# Patient Record
Sex: MALE | Race: WHITE | Employment: FULL TIME | URBAN - METROPOLITAN AREA
[De-identification: names, ages, dates, MRNs, and addresses within clinical notes are randomized per-mention and may not be internally consistent; named-entity substitution may affect disease eponyms.]

---

## 2019-09-24 ENCOUNTER — APPOINTMENT (OUTPATIENT)
Dept: GENERAL RADIOLOGY | Facility: CLINIC | Age: 59
End: 2019-09-24
Attending: PHYSICIAN ASSISTANT
Payer: COMMERCIAL

## 2019-09-24 ENCOUNTER — HOSPITAL ENCOUNTER (EMERGENCY)
Facility: CLINIC | Age: 59
Discharge: HOME | End: 2019-09-24
Attending: PHYSICIAN ASSISTANT | Admitting: PHYSICIAN ASSISTANT
Payer: COMMERCIAL

## 2019-09-24 ENCOUNTER — APPOINTMENT (OUTPATIENT)
Dept: GENERAL RADIOLOGY | Facility: CLINIC | Age: 59
End: 2019-09-24
Attending: EMERGENCY MEDICINE
Payer: COMMERCIAL

## 2019-09-24 VITALS
WEIGHT: 261 LBS | SYSTOLIC BLOOD PRESSURE: 173 MMHG | RESPIRATION RATE: 18 BRPM | TEMPERATURE: 98.3 F | DIASTOLIC BLOOD PRESSURE: 98 MMHG | BODY MASS INDEX: 35.35 KG/M2 | HEART RATE: 86 BPM | HEIGHT: 72 IN | OXYGEN SATURATION: 99 %

## 2019-09-24 DIAGNOSIS — S62.306A CLOSED DISPLACED FRACTURE OF FIFTH METACARPAL BONE OF RIGHT HAND, UNSPECIFIED PORTION OF METACARPAL, INITIAL ENCOUNTER: ICD-10-CM

## 2019-09-24 DIAGNOSIS — S62.304A CLOSED DISPLACED FRACTURE OF FOURTH METACARPAL BONE OF RIGHT HAND, UNSPECIFIED PORTION OF METACARPAL, INITIAL ENCOUNTER: ICD-10-CM
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
## 2019-09-24 DIAGNOSIS — R03.0 ELEVATED BLOOD PRESSURE READING: ICD-10-CM

## 2019-09-24 PROCEDURE — 29125 APPL SHORT ARM SPLINT STATIC: CPT | Mod: RT

## 2019-09-24 PROCEDURE — 73110 X-RAY EXAM OF WRIST: CPT | Mod: RT

## 2019-09-24 PROCEDURE — 73130 X-RAY EXAM OF HAND: CPT | Mod: RT

## 2019-09-24 PROCEDURE — 99284 EMERGENCY DEPT VISIT MOD MDM: CPT

## 2019-09-24 RX ORDER — OXYCODONE HYDROCHLORIDE 5 MG/1
5 TABLET ORAL EVERY 6 HOURS PRN
Qty: 12 TABLET | Refills: 0 | Status: SHIPPED | OUTPATIENT
Start: 2019-09-24 | End: 2020-11-06

## 2019-09-24 ASSESSMENT — ENCOUNTER SYMPTOMS
FACIAL SWELLING: 1
JOINT SWELLING: 1
WOUND: 1

## 2019-09-24 ASSESSMENT — MIFFLIN-ST. JEOR: SCORE: 1867.95

## 2019-09-24 NOTE — ED PROVIDER NOTES
Emergency Department Attending Supervision Note  9/24/2019  5:13 PM    I evaluated this patient in conjunction with Isi Alvarenga PA-C    Visit original note for extended HPI.  Briefly, the patient presented with pain to his right hand after a fall sustained yesterday while trying to catch a bus outside of the [place].  He also hit his face but has minimal discomfort there and does not wish for any further evaluation.  He is not on blood thinners.  He has a fair bit of typing for work.  No prior similar symptoms.  The swelling is improved since yesterday but still persistent.  The pain is manageable.  No focal numbness or weakness.    On my exam, he has tenderness to palpation over the fourth and fifth metacarpals, with swelling to the dorsum of the hand as well as somewhat to the palm as well.  Several superficial abrasions to the palm though no evidence of penetrating injury or open fracture.        Procedure Note - Splint  A custom plaster short arm/hand splint was applied to the R hand/forearm, and the patient's neurovascular status remained unchanged and patient was comfortable.  No complications evident.  Standard splint care instructions and precautions were given.      My impression is that his symptoms can be explained by the fractures of his metacarpals seen on x-ray.  He is neurovascularly intact.  He was placed in a plaster splint.  I recommended close outpatient follow-up with orthopedics, and discussed that he may ultimately need surgery though there are no findings to suggest that this is emergent.  He is welcome back for acute worsening otherwise should follow-up through primary care and orthopedic clinics for ongoing care.    Diagnosis    ICD-10-CM    1. Closed displaced fracture of fourth metacarpal bone of right hand, unspecified portion of metacarpal, initial encounter S62.304A    2. Closed displaced fracture of fifth metacarpal bone of right hand, unspecified portion of metacarpal,  initial encounter S62.306A    3. Fall, initial encounter W19.XXXA    4. Elevated blood pressure reading R03.0        Kenneth Pedraza MD    Scribe Disclosure:  I, Álvaro Matos, am serving as a scribe on 9/24/2019 at 5:13 PM to personally document services performed by Kenneth Pedraza MD based on my observations and the provider's statements to me.     This record was created at least in part using electronic voice recognition software, so please excuse any typographical errors.     Kneneth Pedraza MD  09/25/19 0102

## 2019-09-24 NOTE — ED AVS SNAPSHOT
Emergency Department  [address] [place] [id number]  Phone:  [phone]  Fax:  [phone]                                    Frankie Rubio   MRN: [id number]    Department:   Emergency Department   Date of Visit:  9/24/2019           After Visit Summary Signature Page    I have received my discharge instructions, and my questions have been answered. I have discussed any challenges I see with this plan with the nurse or doctor.    ..........................................................................................................................................  Patient/Patient Representative Signature      ..........................................................................................................................................  Patient Representative Print Name and Relationship to Patient    ..................................................               ................................................  Date                                   Time    ..........................................................................................................................................  Reviewed by Signature/Title    ...................................................              ..............................................  Date                                               Time          22EPIC Rev 08/18

## 2019-09-24 NOTE — DISCHARGE INSTRUCTIONS
Take ibuprofen 600 mg 3x per day with food. This will provide both pain control and fight against inflammation.   Oxycodone as needed for break through pain.   Call tomorrow to schedule follow-up appointment with [facility] orthopedics.    Discharge Instructions  Splint Care    You had a splint put on today to help protect your injury and help it heal.  Splints are used to treat things like strains, sprains, large cuts, and fractures (broken bones). Splints are temporary and are designed to allow for swelling.    Be sure your splint is not too tight!  If you splint is too tight, it may cause loss of blood supply.  Signs of your splint being too tight include: your arm or leg hurting a lot more; your fingers or toes getting numb, cold, pale or blue; or your child is crying, fussing or seeming restless.    Generally, every Emergency Department visit should have a follow-up clinic visit with either a primary or a specialty clinic/provider. Please follow-up as instructed by your emergency provider today.  Return to the Emergency Department right away if:  You have increased pain or pressure around the injury.  You have numbness, tingling, or cool, pale, or blue toes or fingers past the injury.  Your child is more fussy than normal, crying a lot, or restless.  Your splint becomes soft, breaks, or is wet.  Your splint begins to smell bad.  Your splint is cutting into your skin.    Home care:  Keep the injured area above the level of your heart while laying or sitting down.  This will help decrease the swelling and the pain.  Keep the splint dry.  Do not put objects down or inside the splint.  If there is an elastic bandage (Ace  wrap) holding the splint on this may be loosened slightly to relieve pressure or pain.  If pain continues return to the Emergency Department right away.  Do not remove your splint by yourself unless told to by your provider.    Follow-up:  Sometimes the splint put on in the Emergency Department  needs to be changed once the swelling has gone down and a more permanent cast needs to be placed.  This is usually done by a bone specialist provider (Orthopedist).  Follow the instructions given to you by your provider today.    If you were given a prescription for medicine here today, be sure to read all of the information (including the package insert) that comes with your prescription.  This will include important information about the medicine, its side effects, and any warnings that you need to know about.  The pharmacist who fills the prescription can provide more information and answer questions you may have about the medicine.  If you have questions or concerns that the pharmacist cannot address, please call or return to the Emergency Department.     Remember that you can always come back to the Emergency Department if you are not able to see your regular provider in the amount of time listed above, if you get any new symptoms, or if there is anything that worries you.       Discharge Instructions  Hypertension - High Blood Pressure    During you visit to the Emergency Department, your blood pressure was higher than the recommended blood pressure.  This may be related to stress, pain, medication or other temporary conditions. In these cases, your blood pressure may return to normal on its own. If you have a history of high blood pressure, you may need to have your provider adjust your medications. Sometimes, your high measurement here may indicate that you have developed high blood pressure that will stay high unless it is treated. As a general rule, high blood pressure causes problems over years rather than days, weeks, or months. So, while it is important to treat blood pressure, it is rarely important to treat blood pressure immediately. Occasionally we will begin a medication in the Emergency Department; more often we will recommend close follow-up for medications with a primary  doctor/clinic.    Generally, every Emergency Department visit should have a follow-up clinic visit with either a primary or a specialty clinic/provider. Please follow-up as instructed by your emergency provider today.    Return to the Emergency Department if you start to have:  A severe headache.  Chest pain.  Shortness of breath.  Weakness or numbness that affects one part of the body.  Confusion.  Vision changes.  Significant swelling of legs and/or eyes.  A reaction to any medication started in the Emergency Department.    What can I do to help myself?  Avoid alcohol.  Take any blood pressure medicine that you are prescribed.  Get a good night s sleep.  Lower your salt intake.  Exercise.  Lose weight.  Manage stress.  See your doctor regularly    If blood pressure medication was started in the Emergency Department:  The medicine may not have an immediate effect. The body and brain determine what blood pressure you have. The medicine s job is to retrain the body s  thermostat  to a lower blood pressure.  You will need to follow up with your provider to see how this medicine is working for you.  If you were given a prescription for medicine here today, be sure to read all of the information (including the package insert) that comes with your prescription.  This will include important information about the medicine, its side effects, and any warnings that you need to know about.  The pharmacist who fills the prescription can provide more information and answer questions you may have about the medicine.  If you have questions or concerns that the pharmacist cannot address, please call or return to the Emergency Department.   Remember that you can always come back to the Emergency Department if you are not able to see your regular provider in the amount of time listed above, if you get any new symptoms, or if there is anything that worries you.

## 2019-09-24 NOTE — ED PROVIDER NOTES
"  History   Chief Complaint:  Fall    HPI   Frankie Rubio is a 58 year old male who presents for evaluation after a fall. The patient reports that last night while running to catch the bus he tripped and fell onto the ground. He states he braced himself mainly with his right hand and wrist but also hit his face on the ground. He did sustain abrasions to his left knee as well as his nose. After the injury, the patient developed swelling to the right side of his face and right wrist as well as pain in the nose and right wrist. Throughout the day today, the swelling in the patient's face has resolved however the hand and wrist swelling has persisted, prompting his evaluation in the ED. He denies any loss of consciousness.    Allergies:  No known drug allergies    Medications:    The patient is currently on no regular medications.    Past Medical History:    The patient does not have any past pertinent medical history.    Past Surgical History:    History reviewed. No pertinent surgical history.    Family History:    History reviewed. No pertinent family history.     Social History:  The patient presents to the ED accompanied by his brother.    Review of Systems   HENT: Positive for facial swelling (right sided, resolved).         Positive for pain in the nose.   Musculoskeletal: Positive for joint swelling (right wrist).        Positive for pain in right wrist   Skin: Positive for wound (abrasions to left knee, nose).   All other systems reviewed and are negative.      Physical Exam   Patient Vitals for the past 24 hrs:   BP Temp Temp src Pulse Resp SpO2 Height Weight   09/24/19 1743 (!) 173/98 -- -- 86 -- -- -- --   09/24/19 1540 (!) 192/110 98.3  F (36.8  C) Oral 102 18 99 % 1.816 m (5' 11.5\") 118.4 kg (261 lb)     Physical Exam  General: Alert, interactive.   Head:  Scalp is atraumatic.  Swelling and ecchymosis to the nasal bridge.  No crepitus.  No obvious deformity.  Remainder of facial bones " nontender.  Eyes:  EOM intact. The pupils are equal, round, and reactive to light. No scleral icterus.   ENT:                                      Ears:  The external ears are normal.   Nose:  The external nose is normal.  Throat:  The oropharynx is normal. Mucus membranes are moist.                 Neck:  Normal range of motion. There is no rigidity.   CV:  Regular rate and rhythm. No murmur. 2+ radial pulses  Resp:  Breath sounds are clear bilaterally. Non-labored, no retractions or accessory muscle use.  GI:  Abdomen is soft, no distension, no tenderness.   MS:  Diffuse swelling to the right wrist and hand. Tenderness to right 4th and 5th metacarpals. No snuffbox tenderness.  No tenderness to palpation throughout the remaining  finger bones. Diffuse tenderness to the wrist and unable to flex/extend wrist secondary to swelling and pain.    Skin:  Warm and dry.   Neuro: Unable to assess strength of 4th and 5th digits secondary to pain. The thumb exam is normal, including: Adduction, abduction, flexion, extension, opposition. There are no sensory deficits; Median, Ulnar, and Radial nerve function is normal.  Psych:  Awake. Alert.  Appropriate interactions.     Emergency Department Course   Imaging:  Radiographic findings were communicated with the patient who voiced understanding of the findings.    XR Hand Right G/E 3 Views:  1. Mildly comminuted fracture of the proximal portion of the fifth  metacarpal with mild displacement/distraction.  2. Oblique fracture of the proximal portion of the fourth metacarpal  with significant dorsal displacement.  3. Fracture of the fourth metacarpal head and distal diaphysis. There  is displacement at the medial aspect of the metacarpal head.  4. Fracture of the fifth metacarpal neck with mild displacement of a  small fragment.  As read by Radiology.    XR Wrist Right G/E 3 Views:  Again seen are fractures of the proximal and distal fourth  and fifth metacarpal bones as previously  described, unchanged. No  other acute or chronic bony abnormality. Extensive subcutaneous edema  over the dorsum of the hand, wrist and visualized distal forearm.  As read by Radiology.    Procedures:     Splint Placement   Dorsal volar splint was applied to the right upper extremity and after placement I checked and adjusted the fit to ensure proper positioning. The patient was more comfortable with the splint in place. Sensation and circulation are intact after splint placement.    Emergency Department Course:  Past medical records, nursing notes, and vitals reviewed.   1606: I performed an exam of the patient and obtained history, as documented above.    The patient was sent for a right wrist x-ray and right hand x-ray while in the emergency department, findings above.    1718: Service shared with ED physician, Dr. Robert Pedraza who evaluated to patient. See his note for further details.    1745: I rechecked the patient. Checked splint placement.    Findings and plan explained to the patient. Patient discharged home with instructions regarding supportive care, medications, and reasons to return. The importance of close follow-up was reviewed.     Impression & Plan    Medical Decision Making:  Frankie Rubio is a 58 year old male who presents for evaluation of right hand and wrist pain after a fall last night. The patient states he was running to catch the bus and tripped and fell onto the ground, catching himself with his right hand. He also hit his face on the ground and had abrasions to the nose.  Discussed possibility of fracture and imaging would not  at this time.  Plan to follow-up with ENT in 1 week for nasal deformity or difficulty breathing out of the nose.  Patient agrees with this plan.  There is no loss consciousness and no red flag symptoms to suggest need for intracranial hemorrhage and there is no indication for head CT at this time.  Patient agrees.       X-rays reveal various  fractures to the right 4th and 5th metacarpals that do not require reduction at this time. The patient understands that this may change with time and orthopedic follow-up is indicated. Splint was placed and positioning was checked. There is no indication for hand/ortho consultation from the ED. Rather, close follow-up is indicated in the next days.  I recommended calling tomorrow to schedule follow-up appointment.  Splint and fracture precautions for home.  The patient's head-to-toe trauma exam is otherwise normal at this time and no further trauma workup is needed as I believe there are no signs of serious head, neck, chest, spinal, extremity or abdominal injuries.  The patient agrees with this plan all questions and concerns addressed prior to discharge.  Recommended continuing ibuprofen and Tylenol and oxycodone given for breakthrough pain.  I discussed the side effects of this medication including addictive and sedated properties.      Diagnosis:    ICD-10-CM   1. Closed displaced fracture of fourth metacarpal bone of right hand, unspecified portion of metacarpal, initial encounter S62.304A   2. Closed displaced fracture of fifth metacarpal bone of right hand, unspecified portion of metacarpal, initial encounter S62.306A   3. Fall, initial encounter W19.XXXA   4. Elevated blood pressure reading R03.0       Disposition:  Discharged to home    Discharge Medications:   Details   oxyCODONE (ROXICODONE) 5 MG tablet Take 1 tablet (5 mg) by mouth every 6 hours as needed for pain, Disp-12 tablet, R-0, Local Print       Ervin Paris  9/24/2019    EMERGENCY DEPARTMENT  I, Ervin Paris, am serving as a scribe at 4:06 PM on 9/24/2019 to document services personally performed by Isi Alvarenga PA-C based on my observations and the provider's statements to me.        Isi Alvarenga PA-C  09/24/19 1954

## 2020-03-11 ENCOUNTER — HEALTH MAINTENANCE LETTER (OUTPATIENT)
Age: 60
End: 2020-03-11

## 2020-11-06 ENCOUNTER — VIRTUAL VISIT (OUTPATIENT)
Dept: FAMILY MEDICINE | Facility: CLINIC | Age: 60
End: 2020-11-06
Payer: COMMERCIAL

## 2020-11-06 DIAGNOSIS — H60.393 INFECTIVE OTITIS EXTERNA, BILATERAL: Primary | ICD-10-CM

## 2020-11-06 PROCEDURE — 99203 OFFICE O/P NEW LOW 30 MIN: CPT | Mod: 95 | Performed by: INTERNAL MEDICINE

## 2020-11-06 RX ORDER — NEOMYCIN SULFATE, POLYMYXIN B SULFATE, HYDROCORTISONE 3.5; 10000; 1 MG/ML; [USP'U]/ML; MG/ML
3 SOLUTION/ DROPS AURICULAR (OTIC) 4 TIMES DAILY
Qty: 10 ML | Refills: 0 | Status: SHIPPED | OUTPATIENT
Start: 2020-11-06

## 2020-11-06 RX ORDER — METOPROLOL SUCCINATE 50 MG/1
1 TABLET, EXTENDED RELEASE ORAL DAILY
COMMUNITY
Start: 2020-08-18

## 2020-11-06 RX ORDER — ASPIRIN 81 MG/1
81 TABLET, CHEWABLE ORAL DAILY
COMMUNITY
Start: 2020-08-14

## 2020-11-06 NOTE — PROGRESS NOTES
"Frankie Rubio is a 59 year old male who is being evaluated via a billable video visit.      The patient has been notified of following:     \"This video visit will be conducted via a call between you and your physician/provider. We have found that certain health care needs can be provided without the need for an in-person physical exam.  This service lets us provide the care you need with a video conversation.  If a prescription is necessary we can send it directly to your pharmacy.  If lab work is needed we can place an order for that and you can then stop by our lab to have the test done at a later time.    Video visits are billed at different rates depending on your insurance coverage.  Please reach out to your insurance provider with any questions.    If during the course of the call the physician/provider feels a video visit is not appropriate, you will not be charged for this service.\"    Patient has given verbal consent for Video visit? Yes  How would you like to obtain your AVS? MyChart  If you are dropped from the video visit, the video invite should be resent to: Text to cell phone: HERMINIA  Will anyone else be joining your video visit? No    Subjective     Frankie Rubio is a 59 year old male who presents today via video visit for the following health issues:    HPI this is a 59-year-old male who presents clinic today with issues of clear urine drainage.  The patient does not swim.  No obvious exposures.  He has been utilizing Q-tips to the ear.  He is states he has never had an issue with wax buildup in his ears.    Denies any fevers and no significant pain.  No infection.  Discharge is bilateral.        Chief Complaint   Patient presents with     Ear Problem     bilateral       New Patient/Transfer of Care       Video Start Time: 12:51 PM    New Patient/Transfer of Care    Review of Systems   Constitutional, HEENT, cardiovascular, pulmonary, gi and gu systems are negative, except as otherwise " noted.      Objective           Vitals:  No vitals were obtained today due to virtual visit.    Physical Exam     GENERAL: Healthy, alert and no distress  EYES: Eyes grossly normal to inspection.  No discharge or erythema, or obvious scleral/conjunctival abnormalities.  RESP: No audible wheeze, cough, or visible cyanosis.  No visible retractions or increased work of breathing.    SKIN: Visible skin clear. No significant rash, abnormal pigmentation or lesions.  NEURO: Cranial nerves grossly intact.  Mentation and speech appropriate for age.  PSYCH: Mentation appears normal, affect normal/bright, judgement and insight intact, normal speech and appearance well-groomed.      No results found for any visits on 11/06/20.        Assessment & Plan     Frankie was seen today for ear problem.    Diagnoses and all orders for this visit:    Infective otitis externa, bilateral  -     neomycin-polymyxin-hydrocortisone (CORTISPORIN) [id number] otic solution; Place 3 drops into both ears 4 times daily    Patient with clear drainage most consistent with an otitis externa possibly related to the Q-tip utilization.  Instructed him to stop using Q-tips.  He can use warm water to clear the ears or alleviate some of his symptoms in the future.  In the meanwhile we will prescribe Cortisporin drops.  Instructed the patient not to use warm water to the ears on utilizing the Cortisporin drops.    Eczema of the ear canal is a possibility additionally.  If his symptoms recur he needs to come into the clinic so that I can visualize his ear canals        See Patient Instructions    No follow-ups on file.    Papi Nathan MD  [facility]      Video-Visit Details    Type of service:  Video Visit    Video End Time: 11 AM    Originating Location (pt. Location): Home    Distant Location (provider location):  [facility]     Platform used for Video Visit: Fame[employer]

## 2021-01-03 ENCOUNTER — HEALTH MAINTENANCE LETTER (OUTPATIENT)
Age: 61
End: 2021-01-03

## 2021-02-11 ENCOUNTER — NURSE TRIAGE (OUTPATIENT)
Dept: NURSING | Facility: CLINIC | Age: 61
End: 2021-02-11

## 2021-02-12 ENCOUNTER — OFFICE VISIT (OUTPATIENT)
Dept: URGENT CARE | Facility: URGENT CARE | Age: 61
End: 2021-02-12
Payer: COMMERCIAL

## 2021-02-12 VITALS
DIASTOLIC BLOOD PRESSURE: 93 MMHG | OXYGEN SATURATION: 94 % | TEMPERATURE: 96.3 F | SYSTOLIC BLOOD PRESSURE: 169 MMHG | BODY MASS INDEX: 35.89 KG/M2 | HEART RATE: 88 BPM | WEIGHT: 261 LBS

## 2021-02-12 DIAGNOSIS — M54.16 LUMBAR RADICULOPATHY: Primary | ICD-10-CM

## 2021-02-12 PROCEDURE — 99215 OFFICE O/P EST HI 40 MIN: CPT | Performed by: PHYSICIAN ASSISTANT

## 2021-02-12 RX ORDER — METHYLPREDNISOLONE 4 MG
TABLET, DOSE PACK ORAL
Qty: 21 TABLET | Refills: 0 | Status: SHIPPED | OUTPATIENT
Start: 2021-02-12

## 2021-02-12 NOTE — TELEPHONE ENCOUNTER
"Pt reports problem R leg. History of sciatica in R leg. Pt states \"if I sit for half an hour feel like I need to brace myself, not as strong\". Pt states feels weaker after sitting for awhile. Pt states he is able to walk ok after a little. Pt states \"no pain\". Symptoms started a couple of hours ago and came on suddenly after sitting. Pt does not report any other acute symptoms. Pt states symptoms are similar to sciatica symptoms he has had in the past.     Advised pt per protocol ER is recommended to r/o TIA or other serious neurological issue when sudden weakness occurs however if pt thinks symptoms more related to sciatica as previously experienced then ok to f/u with PCP.     Pt declines ER and reports he will go to ER if symptoms worsen or return. Will contact his PCP for f/u.     Reason for Disposition    [1] Weakness (i.e., paralysis, loss of muscle strength) of the face, arm / hand, or leg / foot on one side of the body AND [2] sudden onset AND [3] brief (now gone)    Additional Information    Negative: [1] SEVERE weakness (i.e., unable to walk or barely able to walk, requires support) AND [2] new onset or worsening    Negative: [1] Weakness (i.e., paralysis, loss of muscle strength) of the face, arm / hand, or leg / foot on one side of the body AND [2] sudden onset AND [3] present now    Negative: [1] Numbness (i.e., loss of sensation) of the face, arm / hand, or leg / foot on one side of the body AND [2] sudden onset AND [3] present now    Negative: [1] Loss of speech or garbled speech AND [2] sudden onset AND [3] present now    Negative: Difficult to awaken or acting confused (e.g., disoriented, slurred speech)    Negative: Sounds like a life-threatening emergency to the triager    Negative: Confusion, disorientation, or hallucinations is main symptom    Negative: Neck pain is main symptom (and having weakness, numbness, or tingling in arm / hand because of neck pain)    Negative: Back pain is main symptom " (and having weakness, numbness, or tingling in leg because of back pain)    Negative: Hand pain is main symptom (and having mild weakness, numbness, or tingling in hand related to hand pain)    Negative: Dizziness is main symptom    Negative: Vision loss or change is main symptom    Negative: Followed a head injury within last 3 days    Negative: Followed a neck injury within last 3 days    Negative: [1] Tingling in both hands and/or feet AND [2] breathing faster than normal AND [3] feels similar to prior panic attack or hyperventilation episode    Negative: Weakness in both sides of the body or weakness all over    Negative: Headache  (and neurologic deficit)    Negative: [1] Back pain AND [2] numbness (loss of sensation) in groin or rectal area    Negative: [1] Unable to urinate (or only a few drops) > 4 hours AND [2] bladder feels very full (e.g., palpable bladder or strong urge to urinate)    Negative: [1] Loss of bladder or bowel control (urine or bowel incontinence; wetting self, leaking stool) AND [2] new onset    Protocols used: NEUROLOGIC DEFICIT-A-AH

## 2021-02-13 NOTE — PATIENT INSTRUCTIONS
Schedule an appointment with a PrimaryCare provider   Patient Education     Possible Causes of Low Back or Leg Pain    BIG: The symptoms in your back or leg may be due to pressure on a nerve. This pressure may be caused by a damaged disk or by abnormal bone growth. Either way, you may feel pain, burning, tingling, or numbness. If you have pressure on a nerve that connects to the sciatic nerve, pain may shoot down your leg.    Pressure from the disk  Constant wear and tear can weaken a disk over time and cause back pain. The disk can then be damaged by a sudden movement or injury. If its soft center starts to bulge, the disk may press on a nerve. Or the outside of the disk may tear, and the soft center may squeeze through and pinch a nerve.    Pressure from bone  As a disk wears out, the vertebrae right above and below the disk start to touch. This can put pressure on a nerve. Often, abnormal bone (called bone spurs) grows where the vertebrae rub against each other. This can cause the foramen or the spinal canal to narrow (called stenosis) and press against a nerve.  [employer] last reviewed this educational content on 3/1/2018    [id number] [employer]. [address] [place] [address], [place] [address]. All rights reserved. This information is not intended as a substitute for professional medical care. Always follow your healthcare professional's instructions.

## 2021-02-13 NOTE — PROGRESS NOTES
Chief Complaint   Patient presents with     Urgent Care     Musculoskeletal Problem     x 1 day right leg feels numb on the inside. lower back stiff in the morning. slow walking     Ear Problem     had swimmers ear in December and would like to make sure it is gone       ASSESSMENT/PLAN:  Frankie was seen today for urgent care, musculoskeletal problem and ear problem.    Diagnoses and all orders for this visit:    Lumbar radiculopathy  -     MR Lumbar Spine w/o Contrast  -     methylPREDNISolone (MEDROL DOSEPAK) 4 MG tablet therapy pack; Follow Package Directions  -     Orthopedic & Spine  Referral; Future      Patient's exam findings are interesting because he has no focal neurologic deficit in the L4-L5 and S1 distribution but has difficulty maintaining balance on his right foot and doing heel-to-toe walk.  His Romberg is negative.  Overall I think there is some spinal cord compression causing disruption of the motor neuron and we should get this imaged urgently.  He has no red flag signs of urinary incontinence or retention or saddle paresthesias.  We will get the MRI done in the next day or so and have patient follow-up with his primary care provider or an orthopedic spine specialist for further evaluation.  We also discussed that there is a possible role for  corticosteroids as I suspect a herniated disc causing the symptoms.  Discussed medications proper administration and adverse side effects.  We also discussed there may not be improvement with the steroid but anecdotal evidence suggests otherwise.  I was very direct with him stating if he has any worsening symptoms, new symptoms, fever, chills, saddle paresthesias or urinary issues he needs to go the emergency room immediately.    His external auditory canals had no evidence of infection or otitis externa      40 minutes spent on the date of the encounter doing chart review, history and exam, documentation and further activities as noted  above    The plan of care was discussed with the patient. They understand and agree with the course of treatment prescribed. A printed summary was given including instructions and medications.    SUBJECTIVE:  Frankie is a 60 year old male with a history of right-sided sciatica who presents to urgent care with 1 day of a heavy/numbness feeling inside and down his right leg.  He noticed it sometime last night and had difficulty falling asleep and since then he feels like he is unable to take large steps due to being off balance with his right leg.  Denies any muscle weakness or pain.  Denies any acute injury or trauma.  He denies any fevers or chills.  He denies any headache, confusion, facial asymmetries or weakness, slurred speech and feels otherwise well    ROS: Pertinent ROS neg other than the symptoms noted above in the HPI.     OBJECTIVE:  BP (!) 169/93 (BP Location: Left arm, Patient Position: Standing, Cuff Size: Adult Large)   Pulse 88   Temp 96.3  F (35.7  C) (Tympanic)   Wt 118.4 kg (261 lb)   SpO2 94%   BMI 35.89 kg/m     GENERAL: healthy, alert and no distress  HENT: ear canals and TM's normal  MS: no gross musculoskeletal defects noted, no edema over lower extremities, no tenderness of the lower extremities.  No midline back pain  SKIN: no suspicious lesions or rashes  NEURO: Knee flexion extension, dorsiflexion and plantar flexion strength 5/5.  Walks with a slow and short right foot steps.  Feels heel-to-toe walk.  Romberg negative.  Deep tendon patellar reflex 2+ bilaterally and symmetric.  Negative straight leg raise  BACK: no midline tenderness, no paralumbar tenderness    DIAGNOSTICS    No results found for any visits on 02/12/21.     Current Outpatient Medications   Medication     aspirin (ASA) 81 MG chewable tablet     methylPREDNISolone (MEDROL DOSEPAK) 4 MG tablet therapy pack     metoprolol succinate ER (TOPROL-XL) 50 MG 24 hr tablet     neomycin-polymyxin-hydrocortisone (CORTISPORIN)  3.5-28888-5 otic solution     No current facility-administered medications for this visit.       There is no problem list on file for this patient.     History reviewed. No pertinent past medical history.  History reviewed. No pertinent surgical history.  History reviewed. No pertinent family history.  Social History     Tobacco Use     Smoking status: Former Smoker     Packs/day: 1.00     Years: 30.00     Pack years: 30.00     Types: Cigarettes     Start date: 1976     Quit date: 2005     Years since quittin.1     Smokeless tobacco: Never Used     Tobacco comment: glad i quit   Substance Use Topics     Alcohol use: Not Currently      Patient was seen in conjunction with Bina Corona, NP Student.    Rito Fields PA-C    The use of Dragon/[employer] dictation services may have been used to construct the content in this note; any grammatical or spelling errors are non-intentional. Please contact the author of this note directly if you are in need of any clarification.

## 2021-02-24 ENCOUNTER — TELEPHONE (OUTPATIENT)
Dept: URGENT CARE | Facility: URGENT CARE | Age: 61
End: 2021-02-24

## 2021-02-24 ENCOUNTER — MYC MEDICAL ADVICE (OUTPATIENT)
Dept: FAMILY MEDICINE | Facility: CLINIC | Age: 61
End: 2021-02-24

## 2021-02-24 NOTE — TELEPHONE ENCOUNTER
Called patient to follow up with his progress and see if his MRI was completed. No answer. Called twice. Please try and get a hold of patient and see what his status is. Thanks.

## 2021-04-25 ENCOUNTER — HEALTH MAINTENANCE LETTER (OUTPATIENT)
Age: 61
End: 2021-04-25

## 2021-10-10 ENCOUNTER — HEALTH MAINTENANCE LETTER (OUTPATIENT)
Age: 61
End: 2021-10-10

## 2021-11-18 NOTE — TELEPHONE ENCOUNTER
DIXIE to Dr. Nathan.  Sent this pt a mc to f/u as UC calls did not work.    Dr Dorcas Bolanos Test from 11/16/21 came back negative. Pt is requesting prescription for sinus infection. Please advise. Thanks.

## 2022-05-22 ENCOUNTER — HEALTH MAINTENANCE LETTER (OUTPATIENT)
Age: 62
End: 2022-05-22

## 2022-09-18 ENCOUNTER — HEALTH MAINTENANCE LETTER (OUTPATIENT)
Age: 62
End: 2022-09-18

## 2023-06-04 ENCOUNTER — HEALTH MAINTENANCE LETTER (OUTPATIENT)
Age: 63
End: 2023-06-04